# Patient Record
Sex: MALE
[De-identification: names, ages, dates, MRNs, and addresses within clinical notes are randomized per-mention and may not be internally consistent; named-entity substitution may affect disease eponyms.]

---

## 2022-11-14 ENCOUNTER — NURSE TRIAGE (OUTPATIENT)
Dept: OTHER | Facility: CLINIC | Age: 47
End: 2022-11-14

## 2022-11-15 NOTE — TELEPHONE ENCOUNTER
Location of patient: [place]    Subjective: Caller states \"injury in Sept on right inner ankle\"     Current Symptoms: In September, an object flew out from under the  and injured right inner ankle;  Right ankle swollen and hard;  Denies drainage, SOB, or chest pain;  Bruising;  Opening from initial wound; Onset: 3 days ago; worsening    Associated Symptoms: reduced activity    Pain Severity: 5/10; aching;     Temperature: cleaned with Peroxide; What has been tried: Seeing a specialist for ankle injury    Recommended disposition: See HCP within 4 Hours (or PCP triage)    Care advice provided, patient verbalizes understanding; denies any other questions or concerns; instructed to call back for any new or worsening symptoms. Patient/caller agrees to proceed to the Emergency Department    This triage is a result of a call to [address] [place] [address]. Please do not respond to the triage nurse through this encounter. Any subsequent communication should be directly with the patient. Reason for Disposition   [1] Redness AND [2] painful when touched AND [3] no fever    Protocols used:  Ankle Swelling-ADULT-AH